# Patient Record
Sex: FEMALE | Race: WHITE | URBAN - METROPOLITAN AREA
[De-identification: names, ages, dates, MRNs, and addresses within clinical notes are randomized per-mention and may not be internally consistent; named-entity substitution may affect disease eponyms.]

---

## 2024-10-20 ENCOUNTER — OFFICE VISIT (OUTPATIENT)
Age: 29
End: 2024-10-20

## 2024-10-20 VITALS
SYSTOLIC BLOOD PRESSURE: 120 MMHG | WEIGHT: 186 LBS | OXYGEN SATURATION: 96 % | BODY MASS INDEX: 30.99 KG/M2 | HEART RATE: 135 BPM | DIASTOLIC BLOOD PRESSURE: 76 MMHG | TEMPERATURE: 100.3 F | HEIGHT: 65 IN

## 2024-10-20 DIAGNOSIS — J01.90 ACUTE BACTERIAL SINUSITIS: ICD-10-CM

## 2024-10-20 DIAGNOSIS — R50.9 FEVER, UNSPECIFIED FEVER CAUSE: ICD-10-CM

## 2024-10-20 DIAGNOSIS — J03.00 STREP TONSILLITIS: Primary | ICD-10-CM

## 2024-10-20 DIAGNOSIS — B96.89 ACUTE BACTERIAL SINUSITIS: ICD-10-CM

## 2024-10-20 LAB
INFLUENZA A ANTIGEN, POC: NEGATIVE
INFLUENZA B ANTIGEN, POC: NEGATIVE
Lab: NORMAL
PERFORMING INSTRUMENT: NORMAL
QC PASS/FAIL: NORMAL
SARS-COV-2, POC: NORMAL

## 2024-10-20 RX ORDER — ONDANSETRON 4 MG/1
4 TABLET, ORALLY DISINTEGRATING ORAL 3 TIMES DAILY PRN
Qty: 21 TABLET | Refills: 0 | Status: SHIPPED | OUTPATIENT
Start: 2024-10-20

## 2024-10-20 RX ORDER — METHYLPREDNISOLONE 4 MG
TABLET, DOSE PACK ORAL
Qty: 1 KIT | Refills: 0 | Status: SHIPPED | OUTPATIENT
Start: 2024-10-20 | End: 2024-10-26

## 2024-10-20 RX ORDER — GUAIFENESIN 600 MG/1
600 TABLET, EXTENDED RELEASE ORAL 2 TIMES DAILY
Qty: 30 TABLET | Refills: 0 | Status: SHIPPED | OUTPATIENT
Start: 2024-10-20 | End: 2024-11-04

## 2024-10-20 RX ORDER — BROMPHENIRAMINE MALEATE, PSEUDOEPHEDRINE HYDROCHLORIDE, AND DEXTROMETHORPHAN HYDROBROMIDE 2; 30; 10 MG/5ML; MG/5ML; MG/5ML
5 SYRUP ORAL 4 TIMES DAILY PRN
Qty: 118 ML | Refills: 0 | Status: SHIPPED | OUTPATIENT
Start: 2024-10-20

## 2025-04-16 ENCOUNTER — OFFICE VISIT (OUTPATIENT)
Age: 30
End: 2025-04-16

## 2025-04-16 VITALS
TEMPERATURE: 98.8 F | DIASTOLIC BLOOD PRESSURE: 80 MMHG | HEIGHT: 65 IN | BODY MASS INDEX: 32.12 KG/M2 | WEIGHT: 192.8 LBS | HEART RATE: 114 BPM | SYSTOLIC BLOOD PRESSURE: 126 MMHG

## 2025-04-16 DIAGNOSIS — J03.90 TONSILLITIS: ICD-10-CM

## 2025-04-16 DIAGNOSIS — J02.9 SORE THROAT: Primary | ICD-10-CM

## 2025-04-16 LAB — S PYO AG THROAT QL: NORMAL

## 2025-04-16 RX ORDER — AMOXICILLIN 500 MG/1
500 CAPSULE ORAL 2 TIMES DAILY
Qty: 20 CAPSULE | Refills: 0 | Status: SHIPPED | OUTPATIENT
Start: 2025-04-16 | End: 2025-04-26

## 2025-04-16 ASSESSMENT — ENCOUNTER SYMPTOMS
SHORTNESS OF BREATH: 0
RHINORRHEA: 0
VOMITING: 1
SORE THROAT: 1
NAUSEA: 1
DIARRHEA: 0
TROUBLE SWALLOWING: 0
COUGH: 0
EYE REDNESS: 0
EYE DISCHARGE: 0

## 2025-04-16 NOTE — PATIENT INSTRUCTIONS
Prescription for amoxicillin. Use warm salt water gargle, Chloraseptic spray,  Use over-the-counter Tylenol and Motrin for pain or fever.  Recommend Zyrtec-D twice daily for postnasal drip.  Follow-up with their PCP in 3 to 5 days and worsening symptoms.

## 2025-04-16 NOTE — PROGRESS NOTES
Patrizia Patrick (:  1995) is a 30 y.o. female,New patient, here for evaluation of the following chief complaint(s):  Pharyngitis (Pt c/o chills. Fever, nausea, vomiting, sore throat x 3 days)      Assessment & Plan :  Visit Diagnoses and Associated Orders         Sore throat    -  Primary    POCT rapid strep A [42268 Custom]             Tonsillitis        amoxicillin (AMOXIL) 500 MG capsule [451]                   Patient seen and evaluated for the above symptoms.  Assessment consistent with acute tonsillitis.  Patient is provided a prescription for amoxicillin.  Instructed to use warm salt water gargle, Chloraseptic spray, or cough drops.  Instructed to clean or change toothbrush midway through to prevent reinfection.  Instructed to push oral fluids. The Patient is instructed to use over-the-counter Tylenol and Motrin for pain or fever.  Instructed to follow-up with their PCP in 3 to 5 days and worsening symptoms.  The patient is agreeable with the above plan and denies questions or concerns at this time.         Subjective :    Pharyngitis  Associated symptoms: fever, nausea, sore throat and vomiting    Associated symptoms: no chest pain, no congestion, no cough, no diarrhea, no ear pain, no fatigue, no headaches, no rash, no rhinorrhea and no shortness of breath         30 y.o. female presents with symptoms of Sore throat, nausea, vomiting, and fever. Patient reports onset of symptoms 3 days ago. Reports, started as itchy throat and runny nose.  Reports then Monday she developed a fever and vomited 1 time.  Reports since Monday throat has been significantly painful.  Does endorse an pain with swallowing.  Reports she continues to have hot and cold flashes.  Denies any reoccurring vomiting.  Reports she has been taking ibuprofen as needed for body aches and fever.     Review of Systems   Constitutional:  Positive for chills and fever. Negative for diaphoresis and fatigue.   HENT:  Positive for sore throat.